# Patient Record
Sex: FEMALE | Race: WHITE | Employment: STUDENT | URBAN - METROPOLITAN AREA
[De-identification: names, ages, dates, MRNs, and addresses within clinical notes are randomized per-mention and may not be internally consistent; named-entity substitution may affect disease eponyms.]

---
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
## 2024-11-13 ENCOUNTER — TELEPHONE (OUTPATIENT)
Dept: SURGERY | Facility: CLINIC | Age: 19
End: 2024-11-13

## 2024-11-13 NOTE — TELEPHONE ENCOUNTER
Pt has scheduled herself for new patient apt with Dr. Mcdonald tomorrow at 10am for \"brain cysts\". There is no current imaging to review.    Please call patient and arrange with appropriate provider/service line. Neurology? Neurosurgery?    Thank you!

## 2024-11-15 ENCOUNTER — TELEPHONE (OUTPATIENT)
Dept: SURGERY | Facility: CLINIC | Age: 19
End: 2024-11-15

## 2024-11-15 ENCOUNTER — MED REC SCAN ONLY (OUTPATIENT)
Dept: SURGERY | Facility: CLINIC | Age: 19
End: 2024-11-15

## 2024-11-15 NOTE — TELEPHONE ENCOUNTER
Patient dropped off imaging disc and report from [facility] for Morgan Kirby PA-C to review.    10/29/2024 - CT Brain/ Head WO Contrast    Films uploaded to PACS.  Report left on Morgan Kirby's desk for review and signature.   Disc and report will be returned to the patient at her appointment with Morgan Kirby on 11/21/2024.

## 2024-11-20 ENCOUNTER — TELEPHONE (OUTPATIENT)
Dept: SURGERY | Facility: CLINIC | Age: 19
End: 2024-11-20

## 2024-11-20 NOTE — TELEPHONE ENCOUNTER
I called and spoke with the patient directly.  She states that she developed headaches that onset every day since March.  She reports a history of 6-7 concussions as she was a flyer for cheerleading.  She reports that she will intermittently feel these \"cysts\" when lying down.  She states that they are \"squishy\".  She states that they are not visualized when looking in her scalp.  They are not currently a cosmetic concern.  She has never seen a neurologist for her headaches.  She reports that she thought she may have had a CSF leak at some point as she was having spontaneous clear rhinorrhea.  She states that nothing was found and it eventually stopped.  She is looking for an appointment to review her CT head results.  I reviewed the imaging with Dr. Yañez in clinic today.  There are some discrepancies with the radiology report.  They note a mass arising from the right frontal lobe, but then state that there is chronic underlying bone remodeling secondary to indentation in the external table, which is external to the skull.  A second smaller mass also noted external to the skull.  Reviewed the CT brain in detail.  Intracranially, there are no abnormalities appreciated.  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  There is no role for neurosurgical intervention to address these.  It is unlikely that they are causing her headaches.  Given her history of concussions and frequent headaches, recommend evaluating patient with neurology.  I explained that if she were concerned about the cosmetics surrounding these external masses, she should follow-up with general surgery to discuss further.  All questions were answered at this time.  Patient was understanding of the findings and the need for follow-up with neurology, not neurosurgery.  She states that she does not need a referral for neurology.  I encouraged her to call our front office to schedule an appointment with neurology,  [phone], option 1.  She was appreciative for the call.  I encouraged her to reach out to our office or send a [employer] message if she had any follow-up questions.

## 2024-12-04 ENCOUNTER — MED REC SCAN ONLY (OUTPATIENT)
Dept: SURGERY | Facility: CLINIC | Age: 19
End: 2024-12-04

## 2024-12-19 ENCOUNTER — TELEPHONE (OUTPATIENT)
Dept: SURGERY | Facility: CLINIC | Age: 19
End: 2024-12-19

## 2025-01-29 ENCOUNTER — OFFICE VISIT (OUTPATIENT)
Dept: NEUROLOGY | Facility: CLINIC | Age: 20
End: 2025-01-29
Payer: COMMERCIAL

## 2025-01-29 VITALS
BODY MASS INDEX: 19.83 KG/M2 | WEIGHT: 105 LBS | HEIGHT: 61 IN | HEART RATE: 107 BPM | SYSTOLIC BLOOD PRESSURE: 122 MMHG | OXYGEN SATURATION: 98 % | DIASTOLIC BLOOD PRESSURE: 80 MMHG | RESPIRATION RATE: 16 BRPM

## 2025-01-29 DIAGNOSIS — Z87.828 HISTORY OF TRAUMATIC HEAD INJURY: ICD-10-CM

## 2025-01-29 DIAGNOSIS — G44.52 NPDH (NEW PERSISTENT DAILY HEADACHE): Primary | ICD-10-CM

## 2025-01-29 PROCEDURE — 99204 OFFICE O/P NEW MOD 45 MIN: CPT | Performed by: OTHER

## 2025-01-29 PROCEDURE — 3008F BODY MASS INDEX DOCD: CPT | Performed by: OTHER

## 2025-01-29 PROCEDURE — 3074F SYST BP LT 130 MM HG: CPT | Performed by: OTHER

## 2025-01-29 PROCEDURE — 3079F DIAST BP 80-89 MM HG: CPT | Performed by: OTHER

## 2025-01-29 NOTE — PROGRESS NOTES
Patient here for headaches today.   Patient stated she had a CT scan done   Patient stated she has dents in her head.  Patient wants to know if what's on the CT Scan a possible reason on why she has headaches.

## 2025-01-29 NOTE — PROGRESS NOTES
[facility] OUTPATIENT NEUROLOGY CONSULTATION    Date of consult: 1/29/2025    Assessment:    ICD-10-CM    1. NPDH (new persistent daily headache)  G44.52 MRI BRAIN (W+WO) (CPT=70553) [[id number]]      2. History of traumatic head injury  Z87.828 MRI BRAIN (W+WO) (CPT=70553) [[id number]]        Plan:      Procedures    MRI BRAIN (W+WO) (CPT=70553) [[id number]]     Headache diary advised  Migraine headache education given  NSAIDs prn  See orders and medications filed with this encounter. The patient indicates understanding of these issues and agrees with the plan.  Discussed with patient/family regarding assessment, work up, care plan and possible adverse and side effects of the medications.  RTC 2-3 months  Pt should go ER for any new or worsening symptoms and contact office     Subjective:   CC/Reason for consult: headache     HPI: Anne Thompson is a 19 year old female with past medical history as listed below presents here for initial evaluation of new persistent headache, scalp tenderness, h/o concussion.Patient stated she had a CT scan done ;Patient stated she has dents in her head.Patient wants to know if what's on the CT Scan a possible reason on why she has headaches; denies history of migraine; No new focal weakness, numbness, gait imbalance, vision or speech difficulties. 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    History/Other:   REVIEW OF SYSTEMS:  A comprehensive 14-point system was reviewed. Pertinent positives and negatives are noted in HPI.       Current Outpatient Medications:     ibuprofen 600 MG Oral Tab, Take 1 tablet (600 mg total) by mouth every 8 (eight) hours as needed for Pain or Fever., Disp: 30 tablet, Rfl: 0    Drospirenone-Ethinyl Estradiol 3-0.02 MG Oral Tab, , Disp: , Rfl:     Melatonin 5 MG Oral Tab, Take 2 tablets (10 mg total) by mouth nightly as needed., Disp: , Rfl:   Allergies:  Allergies[1]  History reviewed. No pertinent past medical history.  History reviewed.  No pertinent surgical history.  Social History:  Social History     Tobacco Use    Smoking status: Never    Smokeless tobacco: Never   Substance Use Topics    Alcohol use: No     Family History   Family history unknown: Yes   Patient denies any pertinent family history associated with the current problem    Objective:   Physical Examination:  /80   Pulse 107   Resp 16   Ht 61\"   Wt 105 lb (47.6 kg)   SpO2 98%   BMI 19.84 kg/m²   General: Awake and alert; in no acute distress  HEENT: Eye sclerae are anicteric; scalp is atraumatic  Neck: Supple  Cardiac: Regular rate and regular rhythm  Lungs: Clear   Abdomen:  non-tender  Extremities: No clubbing or cyanosis; moves extremities   Psychiatric: Normal mood and affect; answers questions appropriately  Dermatologic: No rashes; no edema  Neurological Examination:  Language: normal   Speech: no dysarthria  CN: II-XII intact  Motor strength: 5/5 all extremities  Tone: normal  DTRs: 2+ symmetric  Coordination: Normal FTN  Sensory: symmetric   Gait: nl    Data Reviewed on 1/29/2025  Notes Reviewed on 1/29/2025  Labs Reviewed  on 1/29/2025    Alix \"Nahum\"MD [place]   Neurology  [facility]  1/29/2025, 9:10 AM  Consultation Report: being sent/fax/route to requesting provider   CC: Nain Yin MD         [1] No Known Allergies

## 2025-01-29 NOTE — PATIENT INSTRUCTIONS
Refill policies:    Allow 2-3 business days for refills; controlled substances may take longer.  Contact your pharmacy at least 5 days prior to running out of medication and have them send an electronic request or submit request through the “request refill” option in your [employer] account.  Refills are not addressed on weekends; covering physicians do not authorize routine medications on weekends.  No narcotics or controlled substances are refilled after noon on Fridays or by on call physicians.  By law, narcotics must be electronically prescribed.  A 30 day supply with no refills is the maximum allowed.  If your prescription is due for a refill, you may be due for a follow up appointment.  To best provide you care, patients receiving routine medications need to be seen at least once a year.  Patients receiving narcotic/controlled substance medications need to be seen at least once every 3 months.  In the event that your preferred pharmacy does not have the requested medication in stock (e.g. Backordered), it is your responsibility to find another pharmacy that has the requested medication available.  We will gladly send a new prescription to that pharmacy at your request.    Scheduling Tests:    If your physician has ordered radiology tests such as MRI or CT scans, please contact Central Scheduling at [phone] right away to schedule the test.  Once scheduled, the [facility] Centralized Referral Team will work with your insurance carrier to obtain pre-certification or prior authorization.  Depending on your insurance carrier, approval may take 3-10 days.  It is highly recommended patients assure they have received an authorization before having a test performed.  If test is done without insurance authorization, patient may be responsible for the entire amount billed.      Precertification and Prior Authorizations:  If your physician has recommended that you have a procedure or additional testing performed the [facility]  Centralized Referral Team will contact your insurance carrier to obtain pre-certification or prior authorization.    You are strongly encouraged to contact your insurance carrier to verify that your procedure/test has been approved and is a COVERED benefit.  Although the [facility] Centralized Referral Team does its due diligence, the insurance carrier gives the disclaimer that \"Although the procedure is authorized, this does not guarantee payment.\"    Ultimately the patient is responsible for payment.   Thank you for your understanding in this matter.  Paperwork Completion:  If you require FMLA or disability paperwork for your recovery, please make sure to either drop it off or have it faxed to our office at [phone]. Be sure the form has your name and date of birth on it.  The form will be faxed to our Forms Department and they will complete it for you.  There is a 25$ fee for all forms that need to be filled out.  Please be aware there is a 10-14 day turnaround time.  You will need to sign a release of information (HIEN) form if your paperwork does not come with one.  You may call the Forms Department with any questions at [phone].  Their fax number is [phone].

## 2025-02-27 ENCOUNTER — TELEPHONE (OUTPATIENT)
Dept: NEUROLOGY | Facility: CLINIC | Age: 20
End: 2025-02-27

## 2025-02-27 NOTE — TELEPHONE ENCOUNTER
2/27 Left voicemail stating Provider will be out of office, please call back to reschedule appointment. This is a follow-up appt so they can be scheduled with Jayshree if Pt would like a sooner appt. Please assist with rescheduling when Pt returns call.

## (undated) NOTE — LETTER
08/31/22    [facility]   4/17/2005      This patient has been seen in our clinic for a medical condition. She may resume activities required for cheerleading practices, games, and competition. She does not have any restrictions. If you have any questions or concerns please contact our office at [phone].         Emigdio Benavides PA-C

## (undated) NOTE — LETTER
08/31/22    Raynald Showers   4/17/2005      This patient has been seen in our clinic for a medical condition. She may resume activities required for cheerleading practices, games, and competition. She does not have any restrictions. If you have any questions or concerns please contact our office at [phone].         Anderson Collet, PA-C

## (undated) NOTE — LETTER
Date & Time: 9/29/2022, 10:44 PM  Patient: Tia Keys  Encounter Provider(s):    Arcadio Jansen MD       To Whom It May Concern:    Leobardo Collazo was seen and treated in our department on 9/29/2022. She should not participate in gym/sports until Minimum of 1 week and asymptomatic. .    If you have any questions or concerns, please do not hesitate to call.        _____________________________  Physician/APC Signature

## (undated) NOTE — LETTER
Date & Time: 1/10/2023, 3:41 AM  Patient: Arnie Steve  Encounter Provider(s): Saul High DO       To Whom It May Concern:    Mattie Mclean was seen and treated in our department.   She is to be excused from cheerleading until cleared by orthopedic surgery    If you have any questions or concerns, please do not hesitate to call.        _____________________________  Physician/APC Signature